# Patient Record
Sex: FEMALE | Race: AMERICAN INDIAN OR ALASKA NATIVE
[De-identification: names, ages, dates, MRNs, and addresses within clinical notes are randomized per-mention and may not be internally consistent; named-entity substitution may affect disease eponyms.]

---

## 2020-11-03 NOTE — XRAY REPORT
CHEST 1 VIEW 



INDICATION:  Intermittent chest pain for 3 days.



COMPARISON:  None



FINDINGS:

Support devices: None. 



Heart: Within normal limits. 

Lungs/Pleura: No acute air space or interstitial disease. 



Additional findings: None.



IMPRESSION:

 No acute findings.



Signer Name: Masoud Vargas Jr, MD 

Signed: 11/3/2020 8:09 AM

Workstation Name: [id number]

## 2020-11-03 NOTE — EMERGENCY DEPARTMENT REPORT
ED General Adult HPI





- General


Chief complaint: Chest Pain


Stated complaint: CHEST PAIN, BODY ACHES AND HEADACHE


Time Seen by Provider: 11/03/20 08:27


Source: patient


Mode of arrival: Ambulatory


Limitations: No Limitations





- History of Present Illness


Initial comments: 





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 

complaining of a 4-day history of substernal chest discomfort and a popping 

aching fashion which is worse with deep breaths and range of motion and 

palpation of an unknown etiology she states symptoms are also associated with 

some nausea and diarrhea which is been present for the last 3 to 4 days as well.

 She reports no hemoptysis no hematemesis no hematochezia.  Reports no wheezing,

no mucus production, no fever, sweats, or presyncope.


-: Gradual


Radiation: non-radiation


Quality: dull


Consistency: constant


Improves with: none


Worsens with: none


Associated Symptoms: denies: confusion, chest pain, headaches, malaise, nausea/v

omiting, syncope, weakness


Treatments Prior to Arrival: none





- Related Data


                                  Previous Rx's











 Medication  Instructions  Recorded  Last Taken  Type


 


Ketorolac [Toradol] 10 mg PO Q8H PRN #10 tablet 11/03/20 Unknown Rx


 


Omeprazole 40 mg PO DAILY #20 capsule. 11/03/20 Unknown Rx


 


Ondansetron [Zofran Odt] 4 mg PO Q8HR #20 tab.rapdis 11/03/20 Unknown Rx











                                    Allergies











Allergy/AdvReac Type Severity Reaction Status Date / Time


 


chocolate flavor Allergy  Anaphylaxis Verified 11/03/20 07:12


 


shellfish derived Allergy  Anaphylaxis Verified 11/03/20 07:12














ED Review of Systems


ROS: 


Stated complaint: CHEST PAIN, BODY ACHES AND HEADACHE


Other details as noted in HPI





Comment: All other systems reviewed and negative





ED Past Medical Hx





- Past Medical History


Previous Medical History?: Yes


Hx Asthma: Yes





- Surgical History


Past Surgical History?: No





- Social History


Smoking Status: Never Smoker





- Medications


Home Medications: 


                                Home Medications











 Medication  Instructions  Recorded  Confirmed  Last Taken  Type


 


Ketorolac [Toradol] 10 mg PO Q8H PRN #10 tablet 11/03/20  Unknown Rx


 


Omeprazole 40 mg PO DAILY #20 capsule. 11/03/20  Unknown Rx


 


Ondansetron [Zofran Odt] 4 mg PO Q8HR #20 tab.rapdis 11/03/20  Unknown Rx














ED Physical Exam





- General


Limitations: No Limitations


General appearance: alert, in no apparent distress





- Head


Head exam: Present: atraumatic, normocephalic





- Eye


Eye exam: Present: normal appearance





- ENT


ENT exam: Present: mucous membranes moist





- Neck


Neck exam: Present: normal inspection





- Respiratory


Respiratory exam: Present: normal lung sounds bilaterally, chest wall tenderness

(Tenderness with palpation to the left sternal border with palpation.).  Absent:

respiratory distress, wheezes, rales, rhonchi, accessory muscle use, decreased 

breath sounds





- Cardiovascular


Cardiovascular Exam: Present: regular rate, normal rhythm.  Absent: systolic 

murmur, diastolic murmur, rubs, gallop





- GI/Abdominal


GI/Abdominal exam: Present: soft, tenderness (Mild epigastric tenderness with 

palpation.  Positive bowel sounds x4 quads no tenderness at Johns's or 

McBurney's.  Abdomen is soft no distention), normal bowel sounds, other.  

Absent: guarding, rebound





- Extremities Exam


Extremities exam: Present: normal inspection





- Back Exam


Back exam: Present: normal inspection





- Neurological Exam


Neurological exam: Present: alert, oriented X3





- Psychiatric


Psychiatric exam: Present: normal affect, normal mood





- Skin


Skin exam: Present: warm, dry, intact, normal color.  Absent: rash





ED Course


                                   Vital Signs











  11/03/20 11/03/20





  07:11 10:47


 


Temperature 97.6 F 98.1 F


 


Pulse Rate 88 80


 


Respiratory 18 18





Rate  


 


Blood Pressure 150/85 


 


Blood Pressure  129/58





[Right]  


 


O2 Sat by Pulse 96 98





Oximetry  














ED Medical Decision Making





- Radiology Data


Radiology results: report reviewed





Chest x-ray read by Dr. Masoud Escamilla shows no acute findings time is 11/3/2020 

8:09 AM





- Medical Decision Making





This patient presents with chest pain that is very unlikely angina or acute 

coronary syndrome. The emergency department evaluation has not identified any 

cause for suspicion that this chest pain has a cardiac etiology. Based on their 

history, EKG (which showed no evidence of ischemia or infarction) and imaging, 

in addition to the patient's physical exam, I see no evidence at this time for a

 malignant etiology for the patient's chest pain. There is no acute evidence for

 pulmonary embolus, acute myocardial infarction, pneumothorax, Boerhaeve 

syndrome, cardiac tamponade, thoracic artery dissection, or any other emergent 

cardiac, pulmonary or aortic pathology. Given the low pre-test probability for 

cardiac etiology of chest pain and the absence of any sign of ischemia or 

infarction, discharge for outpatient follow-up and further evaluation is 

reasonable.





I have explained to the patient that even though a cardiac problem is very 

unlikely, follow-up and further testing is required to reduce further the 

already small uncertainty that exists. Other life-threatening diagnoses have 

been considered. The patient understands the need to return immediately if their

 symptoms worsen or they develop any new symptoms, and not to engage in any 

significant exertional activity until follow-up is obtained.


Critical care attestation.: 


If time is entered above; I have spent that time in minutes in the direct care 

of this critically ill patient, excluding procedure time.








ED Disposition


Clinical Impression: 


 Chest pain, non-cardiac, Diarrhea





Disposition: DC-01 TO HOME OR SELFCARE


Is pt being admited?: No


Does the pt Need Aspirin: No


Condition: Stable


Instructions:  Chest Pain (ED), Costochondritis (ED), Gastroenteritis (ED)


Prescriptions: 


Omeprazole 40 mg PO DAILY #20 capsule.


Ketorolac [Toradol] 10 mg PO Q8H PRN #10 tablet


 PRN Reason: Pain


Ondansetron [Zofran Odt] 4 mg PO Q8HR #20 tab.rapdis


Referrals: 


PRIMARY CARE,MD [Primary Care Provider] - 3-5 Days


[facility] [Provider Group] - 3-5 Days


NEMESIO WARNER MD [Staff Physician] - 3-5 Days

## 2021-01-07 ENCOUNTER — HOSPITAL ENCOUNTER (EMERGENCY)
Dept: HOSPITAL 5 - ED | Age: 21
Discharge: HOME | End: 2021-01-07
Payer: COMMERCIAL

## 2021-01-07 VITALS — SYSTOLIC BLOOD PRESSURE: 148 MMHG | DIASTOLIC BLOOD PRESSURE: 86 MMHG

## 2021-01-07 DIAGNOSIS — Z91.013: ICD-10-CM

## 2021-01-07 DIAGNOSIS — S66.911A: Primary | ICD-10-CM
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
## 2021-01-07 DIAGNOSIS — Z88.8: ICD-10-CM
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
## 2021-01-07 DIAGNOSIS — J45.909: ICD-10-CM

## 2021-01-07 DIAGNOSIS — Z79.899: ICD-10-CM
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
## 2021-06-29 ENCOUNTER — HOSPITAL ENCOUNTER (EMERGENCY)
Dept: HOSPITAL 5 - ED | Age: 21
Discharge: HOME | End: 2021-06-29
Payer: COMMERCIAL

## 2021-06-29 VITALS — DIASTOLIC BLOOD PRESSURE: 67 MMHG | SYSTOLIC BLOOD PRESSURE: 129 MMHG

## 2021-06-29 DIAGNOSIS — J45.909: ICD-10-CM

## 2021-06-29 DIAGNOSIS — M79.602: ICD-10-CM

## 2021-06-29 DIAGNOSIS — R00.2: ICD-10-CM

## 2021-06-29 DIAGNOSIS — Z91.013: ICD-10-CM

## 2021-06-29 DIAGNOSIS — Z79.899: ICD-10-CM

## 2021-06-29 DIAGNOSIS — Z91.018: ICD-10-CM

## 2021-06-29 DIAGNOSIS — R07.89: Primary | ICD-10-CM

## 2021-06-29 LAB
BASOPHILS # (AUTO): 0.1 K/MM3 (ref 0–0.1)
BASOPHILS NFR BLD AUTO: 1 % (ref 0–1.8)
BUN SERPL-MCNC: 10 MG/DL (ref 7–17)
BUN/CREAT SERPL: 17 %
CALCIUM SERPL-MCNC: 8.8 MG/DL (ref 8.4–10.2)
EOSINOPHIL # BLD AUTO: 0.1 K/MM3 (ref 0–0.4)
EOSINOPHIL NFR BLD AUTO: 1 % (ref 0–4.3)
HCT VFR BLD CALC: 34 % (ref 30.3–42.9)
HEMOLYSIS INDEX: 7
HGB BLD-MCNC: 11.2 GM/DL (ref 10.1–14.3)
LYMPHOCYTES # BLD AUTO: 2.3 K/MM3 (ref 1.2–5.4)
LYMPHOCYTES NFR BLD AUTO: 37.3 % (ref 13.4–35)
MCHC RBC AUTO-ENTMCNC: 33 % (ref 30–34)
MCV RBC AUTO: 74 FL (ref 79–97)
MONOCYTES # (AUTO): 0.7 K/MM3 (ref 0–0.8)
MONOCYTES % (AUTO): 10.9 % (ref 0–7.3)
PLATELET # BLD: 313 K/MM3 (ref 140–440)
RBC # BLD AUTO: 4.61 M/MM3 (ref 3.65–5.03)

## 2021-06-29 PROCEDURE — 85025 COMPLETE CBC W/AUTO DIFF WBC: CPT

## 2021-06-29 PROCEDURE — 80048 BASIC METABOLIC PNL TOTAL CA: CPT

## 2021-06-29 PROCEDURE — 36415 COLL VENOUS BLD VENIPUNCTURE: CPT

## 2021-06-29 PROCEDURE — 71046 X-RAY EXAM CHEST 2 VIEWS: CPT

## 2021-06-29 PROCEDURE — 93005 ELECTROCARDIOGRAM TRACING: CPT

## 2021-06-29 PROCEDURE — 84484 ASSAY OF TROPONIN QUANT: CPT

## 2021-06-29 PROCEDURE — 99283 EMERGENCY DEPT VISIT LOW MDM: CPT

## 2021-06-29 NOTE — XRAY REPORT
CHEST 2 VIEWS 



INDICATION / CLINICAL INFORMATION:

chest pain.



COMPARISON: 

11/3/2020



FINDINGS:



SUPPORT DEVICES: None.

HEART / MEDIASTINUM: No significant abnormality. 

LUNGS / PLEURA: No significant pulmonary or pleural abnormality. No pneumothorax. 



ADDITIONAL FINDINGS: No significant additional findings.



IMPRESSION:

No significant abnormality or interval change from 11/3/2020



Signer Name: Umesh Oneill MD FACR 

Signed: 6/29/2021 8:26 PM

Workstation Name: [id number]

## 2021-06-29 NOTE — EMERGENCY DEPARTMENT REPORT
ED General Adult HPI





- General


Chief complaint: Chest Pain


Stated complaint: CHEST PAINS /LT SIDE NUMBNESS


Time Seen by Provider: 06/29/21 20:20


Source: patient


Mode of arrival: Ambulatory


Limitations: No Limitations





- History of Present Illness


Initial comments: 





Patient is a 21-year-old female presents emergency room with complaints of 

substernal chest pain that began 2 weeks ago.  She states the pain is worse with

movement and palpation.  Patient states that she is also been having left elbow 

pain and tingling down her left arm for 2 weeks.  Patient states that she 

frequently sleeps on this arm and she wakes up with the pain.  She denies any 

complete numbness or weakness.  She denies any radiation of her chest pain.  She

denies any fever, cough, nausea, vomiting, shortness of breath, pleuritic chest 

pain, leg swelling.  She denies any recent travel, recent surgery, recent 

immobilization.  Past medical history of asthma.  No allergies medications.  She

denies tobacco use.





- Related Data


                                  Previous Rx's











 Medication  Instructions  Recorded  Last Taken  Type


 


Ketorolac [Toradol] 10 mg PO Q8H PRN #10 tablet 11/03/20 Unknown Rx


 


Omeprazole 40 mg PO DAILY #20 capsule. 11/03/20 Unknown Rx


 


Ondansetron [Zofran Odt] 4 mg PO Q8HR #20 tab.rapdis 11/03/20 Unknown Rx


 


Naproxen 500 mg PO Q12H PRN #12 tablet 01/07/21 Unknown Rx


 


Menthol/Camphor [Tiger Balm 1 applicatio TP BID #18 oint...g. 06/29/21 Unknown 

Rx





Ointment]    


 


Naproxen [EC-Naprosyn] 375 mg PO BID PRN #20 tablet. 06/29/21 Unknown Rx


 


methOCARBAMOL [Robaxin TAB] 500 mg PO BID PRN #14 tab 06/29/21 Unknown Rx











                                    Allergies











Allergy/AdvReac Type Severity Reaction Status Date / Time


 


chocolate flavor Allergy  Anaphylaxis Verified 01/07/21 11:37


 


shellfish derived Allergy  Anaphylaxis Verified 01/07/21 11:37














ED Review of Systems


ROS: 


Stated complaint: CHEST PAINS /LT SIDE NUMBNESS


Other details as noted in HPI





Comment: All other systems reviewed and negative





ED Past Medical Hx





- Past Medical History


Previous Medical History?: Yes


Hx Asthma: Yes





- Social History


Smoking Status: Never Smoker





- Medications


Home Medications: 


                                Home Medications











 Medication  Instructions  Recorded  Confirmed  Last Taken  Type


 


Ketorolac [Toradol] 10 mg PO Q8H PRN #10 tablet 11/03/20  Unknown Rx


 


Omeprazole 40 mg PO DAILY #20 capsule. 11/03/20  Unknown Rx


 


Ondansetron [Zofran Odt] 4 mg PO Q8HR #20 tab.rapdis 11/03/20  Unknown Rx


 


Naproxen 500 mg PO Q12H PRN #12 tablet 01/07/21  Unknown Rx


 


Menthol/Camphor [Tiger Balm 1 applicatio TP BID #18 oint...g. 06/29/21  Unknown 

Rx





Ointment]     


 


Naproxen [EC-Naprosyn] 375 mg PO BID PRN #20 tablet. 06/29/21  Unknown Rx


 


methOCARBAMOL [Robaxin TAB] 500 mg PO BID PRN #14 tab 06/29/21  Unknown Rx














ED Physical Exam





- General


Limitations: No Limitations


General appearance: alert, in no apparent distress





- Head


Head exam: Present: atraumatic, normocephalic





- Eye


Eye exam: Present: normal appearance





- ENT


ENT exam: Present: mucous membranes moist





- Respiratory


Respiratory exam: Present: normal lung sounds bilaterally, chest wall tenderness

(reproducible anterior sternal ttp, no crepitus, no deformity, no skin changes).

 Absent: respiratory distress, wheezes, rales, rhonchi, stridor, accessory 

muscle use, decreased breath sounds, prolonged expiratory





- Cardiovascular


Cardiovascular Exam: Present: regular rate, normal rhythm, normal heart sounds. 

Absent: systolic murmur, diastolic murmur, rubs, gallop





- Extremities Exam


Extremities exam: Present: normal inspection, full ROM, normal capillary refill,

other (FROM of the BUE, no bony ttp of the BUE, no deformity, no edema, no skin 

changes, neurovascularly intact).  Absent: tenderness, pedal edema, joint 

swelling, calf tenderness





- Neurological Exam


Neurological exam: Present: alert, oriented X3, CN II-XII intact, normal gait.  

Absent: motor sensory deficit





- Psychiatric


Psychiatric exam: Present: normal affect, normal mood





- Skin


Skin exam: Present: warm, dry, intact





ED Course


                                   Vital Signs











  06/29/21 06/29/21





  17:33 21:30


 


Temperature 98.7 F 


 


Pulse Rate 83 92 H


 


Respiratory 18 17





Rate  


 


Blood Pressure 129/67 


 


O2 Sat by Pulse 98 100





Oximetry  














ED Medical Decision Making





- Lab Data


Result diagrams: 


                                 06/29/21 19:38





                                 06/29/21 19:38





- EKG Data


EKG shows normal: sinus rhythm, axis, intervals, QRS complexes, ST-T waves


Rate: normal





- Radiology Data


Radiology results: report reviewed





Ordering Physician: VITALIY WHALEY  


Date of Service: 06/29/21  


Procedure(s): XR chest routine 2V  


Accession Number(s): [id number]  


 


cc: VITALIY WHALEY   


 


Fluoro Time In Minutes:   


 


CHEST 2 VIEWS   


 


 INDICATION / CLINICAL INFORMATION:  


 chest pain.  


 


 COMPARISON:   


 11/3/2020  


 


 FINDINGS:  


 


 SUPPORT DEVICES: None.  


 HEART / MEDIASTINUM: No significant abnormality.   


 LUNGS / PLEURA: No significant pulmonary or pleural abnormality. No 

pneumothorax.   


 


 ADDITIONAL FINDINGS: No significant additional findings.  


 


 IMPRESSION:  


 No significant abnormality or interval change from 11/3/2020  


 


 Signer Name: Umesh Oneill MD FACR   


 Signed: 6/29/2021 8:26 PM  


 Workstation Name: [id number]   


 


 


Transcribed By: MS  


Dictated By: Umesh Oneill MD  


Electronically Authenticated By: Umesh Oneill MD    


Signed Date/Time: 06/29/21 2026                                


 


 


 


DD/DT: 06/29/21 2025                                                            

 


TD/TT:





























- Medical Decision Making





Patient is a 21-year-old female presents emergency room with complaints of 

substernal chest pain that began 2 weeks ago.  She states the pain is worse with

movement and palpation.  Patient states that she is also been having left elbow 

pain and tingling down her left arm for 2 weeks.  Patient states that she 

frequently sleeps on this arm and she wakes up with the pain.  She denies any 

complete numbness or weakness.  She denies any radiation of her chest pain.  She

denies any fever, cough, nausea, vomiting, shortness of breath, pleuritic chest 

pain, leg swelling.  She denies any recent travel, recent surgery, recent immo

bilization.  Past medical history of asthma.  No allergies medications.  She 

denies tobacco use. vitals are stable. on exam: reproducible anterior sternal 

ttp, no crepitus, no deformity, no skin changes, FROM of the BUE, no bony ttp of

the BUE, no deformity, no edema, no skin changes, neurovascularly intact.  

Orders placed prior to my examination.  EKG is within normal limits.  Chest x-

ray with no acute process.  Labs are normal, troponin is negative.  Heart score 

is 1, low risk for cardiac event, do not suspect ACS.  Chest pain appears most 

consistent with costochondritis.  Left arm pain could be due to patient sleeping

on this arm, she has no signs of septic joint, no clinical signs of DVT, 

sensation is intact, no skin changes, no joint deformities.  Patient is PERC 

criteria negative for PE.  Patient given prescription for medications.  Advised 

patient Please take medication as prescribed as needed.  May use ice for 15 

minutes at a time, heating pad 15 minutes at a time.  Avoid sleeping on your 

arm.  do not use tiger balm while using heat or ice. Follow-up with primary care

doctor.  Follow-up with orthopedic doctor.  Return to emergency room for new or 

worse symptoms.


Critical care attestation.: 


If time is entered above; I have spent that time in minutes in the direct care 

of this critically ill patient, excluding procedure time.








ED Disposition


Clinical Impression: 


 Chest pain, Left arm pain





Disposition: DC-01 TO HOME OR SELFCARE


Is pt being admited?: No


Does the pt Need Aspirin: No


Condition: Stable


Instructions:  Nonspecific Chest Pain, Adult, Costochondritis


Additional Instructions: 


Please take medication as prescribed as needed.  May use ice for 15 minutes at a

time, heating pad 15 minutes at a time.  Avoid sleeping on your arm.  do not use

tiger balm while using heat or ice. Follow-up with primary care doctor.  Follow-

up with orthopedic doctor.  Return to emergency room for new or worse symptoms.


Prescriptions: 


Naproxen [EC-Naprosyn] 375 mg PO BID PRN #20 tablet.dr


 PRN Reason: pain


methOCARBAMOL [Robaxin TAB] 500 mg PO BID PRN #14 tab


 PRN Reason: muscle spasm/pain


Menthol/Camphor [Tiger Balm Ointment] 1 applicatio TP BID #18 oint...g.


Referrals: 


NEMESIO WARNER MD [Staff Physician] - 3-5 Days


[facility] [Provider Group] - 3-5 Days


AGUSTINA MORILLO MD [Staff Physician] - 3-5 Days


[facility] [Provider Group] - 3-5 Days


Forms:  Work/School Release Form(ED)


Time of Disposition: 21:04


Print Language: ENGLISH





HEART Score





- HEART Score


History: Slightly suspicious


EKG: Normal


Age: < 45


Risk factors: 1-2 risk factors


Troponin: 


                                        











Troponin T  < 0.010 ng/mL (0.00-0.029)   06/29/21  19:38    











Troponin: < normal limit


HEART Score: 1

## 2021-06-30 NOTE — ELECTROCARDIOGRAPH REPORT
[facility]

                                       

Test Date:    2021               Test Time:    17:37:26

Pat Name:     CARLTON AVILES           Department:   

Patient ID:   [id number]          Room:          

Gender:       F                        Technician:   PAUL VÁZQUEZB:          [id number]               Requested By: VITALIY WHALEY

Order Number: [id number]              Reading MD:   Solomon Banegas

                                 Measurements

Intervals                              Axis          

Rate:         86                       P:            48

NH:           162                      QRS:          34

QRSD:         88                       T:            18

QT:           371                                    

QTc:          443                                    

                           Interpretive Statements

Sinus rhythm

No previous ECG available for comparison

Electronically Signed On 2021 9:57:30 EDT by Solomon Banegas